# Patient Record
Sex: FEMALE | Race: WHITE | Employment: UNEMPLOYED | URBAN - METROPOLITAN AREA
[De-identification: names, ages, dates, MRNs, and addresses within clinical notes are randomized per-mention and may not be internally consistent; named-entity substitution may affect disease eponyms.]

---

## 2022-06-16 ENCOUNTER — OFFICE VISIT (OUTPATIENT)
Dept: INTERNAL MEDICINE | Age: 57
End: 2022-06-16
Payer: COMMERCIAL

## 2022-06-16 VITALS
DIASTOLIC BLOOD PRESSURE: 76 MMHG | HEART RATE: 102 BPM | BODY MASS INDEX: 38.32 KG/M2 | HEIGHT: 65 IN | TEMPERATURE: 99.5 F | WEIGHT: 230 LBS | SYSTOLIC BLOOD PRESSURE: 118 MMHG | OXYGEN SATURATION: 95 %

## 2022-06-16 DIAGNOSIS — B34.9 VIRAL ILLNESS: ICD-10-CM

## 2022-06-16 DIAGNOSIS — J01.40 ACUTE PANSINUSITIS, RECURRENCE NOT SPECIFIED: Primary | ICD-10-CM

## 2022-06-16 LAB
Lab: NORMAL
PERFORMING INSTRUMENT: NORMAL
QC PASS/FAIL: NORMAL
SARS-COV-2, POC: NORMAL

## 2022-06-16 PROCEDURE — 87426 SARSCOV CORONAVIRUS AG IA: CPT | Performed by: NURSE PRACTITIONER

## 2022-06-16 PROCEDURE — 99203 OFFICE O/P NEW LOW 30 MIN: CPT | Performed by: NURSE PRACTITIONER

## 2022-06-16 RX ORDER — AMOXICILLIN AND CLAVULANATE POTASSIUM 875; 125 MG/1; MG/1
1 TABLET, FILM COATED ORAL 2 TIMES DAILY
Qty: 20 TABLET | Refills: 0 | Status: SHIPPED | OUTPATIENT
Start: 2022-06-16 | End: 2022-06-26

## 2022-06-16 RX ORDER — CETIRIZINE HYDROCHLORIDE 10 MG/1
10 TABLET ORAL DAILY
Qty: 30 TABLET | Refills: 0 | Status: SHIPPED | OUTPATIENT
Start: 2022-06-16 | End: 2022-07-16

## 2022-06-16 RX ORDER — FLUTICASONE PROPIONATE 50 MCG
1 SPRAY, SUSPENSION (ML) NASAL DAILY
Qty: 1 EACH | Refills: 1 | Status: SHIPPED | OUTPATIENT
Start: 2022-06-16

## 2022-06-16 SDOH — ECONOMIC STABILITY: FOOD INSECURITY: WITHIN THE PAST 12 MONTHS, YOU WORRIED THAT YOUR FOOD WOULD RUN OUT BEFORE YOU GOT MONEY TO BUY MORE.: NEVER TRUE

## 2022-06-16 SDOH — ECONOMIC STABILITY: FOOD INSECURITY: WITHIN THE PAST 12 MONTHS, THE FOOD YOU BOUGHT JUST DIDN'T LAST AND YOU DIDN'T HAVE MONEY TO GET MORE.: NEVER TRUE

## 2022-06-16 ASSESSMENT — PATIENT HEALTH QUESTIONNAIRE - PHQ9
7. TROUBLE CONCENTRATING ON THINGS, SUCH AS READING THE NEWSPAPER OR WATCHING TELEVISION: 0
SUM OF ALL RESPONSES TO PHQ9 QUESTIONS 1 & 2: 0
SUM OF ALL RESPONSES TO PHQ QUESTIONS 1-9: 0
8. MOVING OR SPEAKING SO SLOWLY THAT OTHER PEOPLE COULD HAVE NOTICED. OR THE OPPOSITE, BEING SO FIGETY OR RESTLESS THAT YOU HAVE BEEN MOVING AROUND A LOT MORE THAN USUAL: 0
2. FEELING DOWN, DEPRESSED OR HOPELESS: 0
4. FEELING TIRED OR HAVING LITTLE ENERGY: 0
SUM OF ALL RESPONSES TO PHQ QUESTIONS 1-9: 0
1. LITTLE INTEREST OR PLEASURE IN DOING THINGS: 0
3. TROUBLE FALLING OR STAYING ASLEEP: 0
10. IF YOU CHECKED OFF ANY PROBLEMS, HOW DIFFICULT HAVE THESE PROBLEMS MADE IT FOR YOU TO DO YOUR WORK, TAKE CARE OF THINGS AT HOME, OR GET ALONG WITH OTHER PEOPLE: 0
5. POOR APPETITE OR OVEREATING: 0
6. FEELING BAD ABOUT YOURSELF - OR THAT YOU ARE A FAILURE OR HAVE LET YOURSELF OR YOUR FAMILY DOWN: 0
9. THOUGHTS THAT YOU WOULD BE BETTER OFF DEAD, OR OF HURTING YOURSELF: 0

## 2022-06-16 ASSESSMENT — ENCOUNTER SYMPTOMS
EYE ITCHING: 0
COUGH: 1
RHINORRHEA: 1
SORE THROAT: 0
SINUS PRESSURE: 1
SHORTNESS OF BREATH: 0
EYE REDNESS: 0
SINUS PAIN: 1
EYE DISCHARGE: 0
WHEEZING: 0

## 2022-06-16 ASSESSMENT — SOCIAL DETERMINANTS OF HEALTH (SDOH): HOW HARD IS IT FOR YOU TO PAY FOR THE VERY BASICS LIKE FOOD, HOUSING, MEDICAL CARE, AND HEATING?: NOT HARD AT ALL

## 2022-06-16 NOTE — PROGRESS NOTES
Stephen Napier (:  1965) is a 59 y.o. female, New patient, here for evaluation of the following chief complaint(s):  Cough (coughed up blood x's 3 days )      Vitals:    22 0923   BP: 118/76   Pulse: (!) 102   Temp: 99.5 °F (37.5 °C)   SpO2: 95%       ASSESSMENT/PLAN:  1. Acute pansinusitis, recurrence not specified  -     amoxicillin-clavulanate (AUGMENTIN) 875-125 MG per tablet; Take 1 tablet by mouth 2 times daily for 10 days, Disp-20 tablet, R-0Normal  -     cetirizine (ZYRTEC) 10 MG tablet; Take 1 tablet by mouth daily, Disp-30 tablet, R-0Normal  -     fluticasone (FLONASE) 50 MCG/ACT nasal spray; 1 spray by Nasal route daily, Disp-1 each, R-1Normal  2. Viral illness  -     POCT COVID-19, Antigen                              NEG        -     OTC symptom control        -      Tylenol or motrin as needed        -      Fluids encouraged      Return if symptoms worsen or fail to improve. SUBJECTIVE/OBJECTIVE:    Cough  This is a new problem. Episode onset: x3 days cough. The problem has been unchanged. The problem occurs hourly. The cough is productive of sputum (brown, yellow with blood). Associated symptoms include ear pain (bilateral itchy and pressure), nasal congestion, postnasal drip and rhinorrhea. Pertinent negatives include no chest pain, chills, eye redness, fever, headaches, myalgias, sore throat (tickly), shortness of breath, sweats or wheezing. Associated symptoms comments: Right side maxillary sinus pain, itchy ears. Treatments tried: mucinex. The treatment provided mild relief. Her past medical history is significant for environmental allergies. Review of Systems   Constitutional: Negative for chills, fatigue and fever. HENT: Positive for congestion, ear pain (bilateral itchy and pressure), postnasal drip, rhinorrhea, sinus pressure and sinus pain (right maxillary). Negative for sore throat (tickly). Eyes: Negative for discharge, redness and itching. Respiratory: Positive for cough. Negative for shortness of breath and wheezing. Cardiovascular: Negative for chest pain and palpitations. Musculoskeletal: Negative for arthralgias, myalgias and neck pain. Allergic/Immunologic: Positive for environmental allergies. Neurological: Negative for dizziness, light-headedness and headaches. Physical Exam  Vitals reviewed. Constitutional:       General: She is not in acute distress. Appearance: Normal appearance. HENT:      Right Ear: A middle ear effusion is present. Tympanic membrane is not erythematous. Left Ear: A middle ear effusion is present. Tympanic membrane is not erythematous. Nose: Mucosal edema present. Right Turbinates: Swollen. Left Turbinates: Swollen. Right Sinus: Maxillary sinus tenderness (pain) and frontal sinus tenderness present. Left Sinus: Maxillary sinus tenderness and frontal sinus tenderness present. Mouth/Throat:      Lips: Pink. Mouth: Mucous membranes are moist.      Pharynx: Oropharynx is clear. No oropharyngeal exudate or posterior oropharyngeal erythema. Cardiovascular:      Rate and Rhythm: Normal rate and regular rhythm. Heart sounds: Normal heart sounds, S1 normal and S2 normal.   Pulmonary:      Effort: Pulmonary effort is normal. No respiratory distress. Breath sounds: Normal air entry. No decreased breath sounds, wheezing, rhonchi or rales. Skin:     General: Skin is warm and dry. Neurological:      Mental Status: She is alert and oriented to person, place, and time. Psychiatric:         Mood and Affect: Mood normal.         Behavior: Behavior is cooperative. An electronic signature was used to authenticate this note.     --BRAD Chiang

## 2022-06-16 NOTE — PATIENT INSTRUCTIONS
Patient Education        Sinusitis: Care Instructions  Your Care Instructions     Sinusitis is an infection of the lining of the sinus cavities in your head. Sinusitis often follows a cold. It causes pain and pressure in your head andface. In most cases, sinusitis gets better on its own in 1 to 2 weeks. But some mildsymptoms may last for several weeks. Sometimes antibiotics are needed. Follow-up care is a key part of your treatment and safety. Be sure to make and go to all appointments, and call your doctor if you are having problems. It's also a good idea to know your test results and keep alist of the medicines you take. How can you care for yourself at home?  Take an over-the-counter pain medicine, such as acetaminophen (Tylenol), ibuprofen (Advil, Motrin), or naproxen (Aleve). Read and follow all instructions on the label.  If the doctor prescribed antibiotics, take them as directed. Do not stop taking them just because you feel better. You need to take the full course of antibiotics.  Be careful when taking over-the-counter cold or flu medicines and Tylenol at the same time. Many of these medicines have acetaminophen, which is Tylenol. Read the labels to make sure that you are not taking more than the recommended dose. Too much acetaminophen (Tylenol) can be harmful.  Breathe warm, moist air from a steamy shower, a hot bath, or a sink filled with hot water. Avoid cold, dry air. Using a humidifier in your home may help. Follow the directions for cleaning the machine.  Use saline (saltwater) nasal washes. This can help keep your nasal passages open and wash out mucus and bacteria. You can buy saline nose drops at a grocery store or drugstore. Or you can make your own at home by adding 1 teaspoon of salt and 1 teaspoon of baking soda to 2 cups of distilled water. If you make your own, fill a bulb syringe with the solution, insert the tip into your nostril, and squeeze gently. Francisco Lisbet your nose.    Put a hot, wet towel or a warm gel pack on your face 3 or 4 times a day for 5 to 10 minutes each time.  Try a decongestant nasal spray like oxymetazoline (Afrin). Do not use it for more than 3 days in a row. Using it for more than 3 days can make your congestion worse. When should you call for help? Call your doctor now or seek immediate medical care if:     You have new or worse swelling or redness in your face or around your eyes.      You have a new or higher fever. Watch closely for changes in your health, and be sure to contact your doctor if:     You have new or worse facial pain.      The mucus from your nose becomes thicker (like pus) or has new blood in it.      You are not getting better as expected. Where can you learn more? Go to https://Wakie/Budistpepiceweb.Wistia. org and sign in to your [employer] account. Enter I683 in the [employer] box to learn more about \"Sinusitis: Care Instructions. \"     If you do not have an account, please click on the \"Sign Up Now\" link. Current as of: September 8, 2021               Content Version: 13.2  © 2006-2022 [employer]. Care instructions adapted under license by [facility] ([facility]). If you have questions about a medical condition or this instruction, always ask your healthcare professional. [address] any warranty or liability for your use of this information. Patient Education        Viral Infections: Care Instructions  Overview     You don't feel well, but it's not clear what's causing it. You may have a viral infection. Viruses cause many illnesses, such as the common cold, influenza, fever, rashes, and the diarrhea, nausea, and vomiting that are symptoms of a stomach infection. You may wonder if antibiotic medicines could make you feel better. But antibiotics only treat infections caused by bacteria. They don'twork on viruses. The good news is that viral infections usually aren't serious.  Most will go away in a few days without medical treatment. In the meantime, there are a fewthings you can do to make yourself more comfortable. Follow-up care is a key part of your treatment and safety. Be sure to make and go to all appointments, and call your doctor if you are having problems. It's also a good idea to know your test results and keep alist of the medicines you take. How can you care for yourself at home?  Get plenty of rest if you feel tired.  Take an over-the-counter pain medicine if needed, such as acetaminophen (Tylenol), ibuprofen (Advil, Motrin), or naproxen (Aleve). Read and follow all instructions on the label.  Be careful when taking over-the-counter cold or flu medicines and Tylenol at the same time. Many of these medicines have acetaminophen, which is Tylenol. Read the labels to make sure that you are not taking more than the recommended dose. Too much acetaminophen (Tylenol) can be harmful.  Drink plenty of fluids. If you have kidney, heart, or liver disease and have to limit fluids, talk with your doctor before you increase the amount of fluids you drink.  Stay home from work, school, and other public places while you have a fever. When should you call for help? Call 911 anytime you think you may need emergency care. For example, call if:     You have severe trouble breathing.      You passed out (lost consciousness). Call your doctor now or seek immediate medical care if:     You seem to be getting much sicker.      You have a new or higher fever.      You have blood in your stools.      You have new belly pain, or your pain gets worse.      You have a new rash. Watch closely for changes in your health, and be sure to contact your doctor if:     You start to get better and then get worse.      You do not get better as expected. Where can you learn more? Go to https://eden.[employer]. org and sign in to your [facility] account.  Enter W202 in the [address] Information box to learn more about \"Viral Infections: Care Instructions. \"     If you do not have an account, please click on the \"Sign Up Now\" link. Current as of: July 1, 2021               Content Version: 13.2  © 0285-0524 [employer]. Care instructions adapted under license by [facility] ([facility]). If you have questions about a medical condition or this instruction, always ask your healthcare professional. [address] any warranty or liability for your use of this information.

## 2025-08-03 ENCOUNTER — APPOINTMENT (OUTPATIENT)
Dept: CARDIOLOGY | Facility: HOSPITAL | Age: 60
End: 2025-08-03
Payer: COMMERCIAL

## 2025-08-03 ENCOUNTER — HOSPITAL ENCOUNTER (OUTPATIENT)
Facility: HOSPITAL | Age: 60
Setting detail: OBSERVATION
End: 2025-08-03
Attending: EMERGENCY MEDICINE | Admitting: HOSPITALIST
Payer: COMMERCIAL

## 2025-08-03 ENCOUNTER — APPOINTMENT (OUTPATIENT)
Dept: RADIOLOGY | Facility: HOSPITAL | Age: 60
End: 2025-08-03
Payer: COMMERCIAL

## 2025-08-03 VITALS
BODY MASS INDEX: 41.4 KG/M2 | SYSTOLIC BLOOD PRESSURE: 153 MMHG | RESPIRATION RATE: 18 BRPM | HEIGHT: 64 IN | OXYGEN SATURATION: 95 % | TEMPERATURE: 99.3 F | HEART RATE: 110 BPM | WEIGHT: 242.51 LBS | DIASTOLIC BLOOD PRESSURE: 89 MMHG

## 2025-08-03 DIAGNOSIS — R07.9 CHEST PAIN, UNSPECIFIED TYPE: Primary | ICD-10-CM

## 2025-08-03 DIAGNOSIS — I25.9 CHEST PAIN DUE TO MYOCARDIAL ISCHEMIA, UNSPECIFIED ISCHEMIC CHEST PAIN TYPE: ICD-10-CM

## 2025-08-03 DIAGNOSIS — I10 HYPERTENSION, UNSPECIFIED TYPE: ICD-10-CM

## 2025-08-03 LAB
ALBUMIN SERPL BCP-MCNC: 4.5 G/DL (ref 3.4–5)
ALP SERPL-CCNC: 84 U/L (ref 33–136)
ALT SERPL W P-5'-P-CCNC: 11 U/L (ref 7–45)
ANION GAP SERPL CALC-SCNC: 14 MMOL/L (ref 10–20)
AST SERPL W P-5'-P-CCNC: 13 U/L (ref 9–39)
ATRIAL RATE: 84 BPM
BASOPHILS # BLD AUTO: 0.06 X10*3/UL (ref 0–0.1)
BASOPHILS NFR BLD AUTO: 0.7 %
BILIRUB SERPL-MCNC: 0.6 MG/DL (ref 0–1.2)
BNP SERPL-MCNC: 47 PG/ML (ref 0–99)
BUN SERPL-MCNC: 9 MG/DL (ref 6–23)
CALCIUM SERPL-MCNC: 9.2 MG/DL (ref 8.6–10.3)
CARDIAC TROPONIN I PNL SERPL HS: 3 NG/L (ref 0–13)
CARDIAC TROPONIN I PNL SERPL HS: 4 NG/L (ref 0–13)
CARDIAC TROPONIN I PNL SERPL HS: 4 NG/L (ref 0–13)
CARDIAC TROPONIN I PNL SERPL HS: 5 NG/L (ref 0–13)
CHLORIDE SERPL-SCNC: 107 MMOL/L (ref 98–107)
CHOLEST SERPL-MCNC: 160 MG/DL (ref 0–199)
CHOLESTEROL/HDL RATIO: 3.2
CO2 SERPL-SCNC: 24 MMOL/L (ref 21–32)
CREAT SERPL-MCNC: 0.68 MG/DL (ref 0.5–1.05)
EGFRCR SERPLBLD CKD-EPI 2021: >90 ML/MIN/1.73M*2
EOSINOPHIL # BLD AUTO: 0.19 X10*3/UL (ref 0–0.7)
EOSINOPHIL NFR BLD AUTO: 2.1 %
ERYTHROCYTE [DISTWIDTH] IN BLOOD BY AUTOMATED COUNT: 12.8 % (ref 11.5–14.5)
EST. AVERAGE GLUCOSE BLD GHB EST-MCNC: 108 MG/DL
GLUCOSE SERPL-MCNC: 113 MG/DL (ref 74–99)
HBA1C MFR BLD: 5.4 % (ref ?–5.7)
HCT VFR BLD AUTO: 41.6 % (ref 36–46)
HDLC SERPL-MCNC: 50.1 MG/DL
HGB BLD-MCNC: 14.3 G/DL (ref 12–16)
IMM GRANULOCYTES # BLD AUTO: 0.02 X10*3/UL (ref 0–0.7)
IMM GRANULOCYTES NFR BLD AUTO: 0.2 % (ref 0–0.9)
INR PPP: 1 (ref 0.9–1.1)
LDLC SERPL CALC-MCNC: 96 MG/DL
LYMPHOCYTES # BLD AUTO: 2.12 X10*3/UL (ref 1.2–4.8)
LYMPHOCYTES NFR BLD AUTO: 23.3 %
MAGNESIUM SERPL-MCNC: 2.07 MG/DL (ref 1.6–2.4)
MCH RBC QN AUTO: 29.6 PG (ref 26–34)
MCHC RBC AUTO-ENTMCNC: 34.4 G/DL (ref 32–36)
MCV RBC AUTO: 86 FL (ref 80–100)
MONOCYTES # BLD AUTO: 0.48 X10*3/UL (ref 0.1–1)
MONOCYTES NFR BLD AUTO: 5.3 %
NEUTROPHILS # BLD AUTO: 6.23 X10*3/UL (ref 1.2–7.7)
NEUTROPHILS NFR BLD AUTO: 68.4 %
NON HDL CHOLESTEROL: 110 MG/DL (ref 0–149)
NRBC BLD-RTO: 0 /100 WBCS (ref 0–0)
P AXIS: 44 DEGREES
P OFFSET: 183 MS
P ONSET: 126 MS
PLATELET # BLD AUTO: 355 X10*3/UL (ref 150–450)
POTASSIUM SERPL-SCNC: 3.6 MMOL/L (ref 3.5–5.3)
PR INTERVAL: 180 MS
PROT SERPL-MCNC: 7.1 G/DL (ref 6.4–8.2)
PROTHROMBIN TIME: 11.3 SECONDS (ref 9.8–12.4)
Q ONSET: 216 MS
QRS COUNT: 14 BEATS
QRS DURATION: 80 MS
QT INTERVAL: 396 MS
QTC CALCULATION(BAZETT): 467 MS
QTC FREDERICIA: 442 MS
R AXIS: 162 DEGREES
RBC # BLD AUTO: 4.83 X10*6/UL (ref 4–5.2)
SODIUM SERPL-SCNC: 141 MMOL/L (ref 136–145)
T AXIS: 68 DEGREES
T OFFSET: 414 MS
TRIGL SERPL-MCNC: 72 MG/DL (ref 0–149)
VENTRICULAR RATE: 84 BPM
VLDL: 14 MG/DL (ref 0–40)
WBC # BLD AUTO: 9.1 X10*3/UL (ref 4.4–11.3)

## 2025-08-03 PROCEDURE — 84484 ASSAY OF TROPONIN QUANT: CPT | Performed by: HOSPITALIST

## 2025-08-03 PROCEDURE — 36415 COLL VENOUS BLD VENIPUNCTURE: CPT | Performed by: HOSPITALIST

## 2025-08-03 PROCEDURE — 2500000004 HC RX 250 GENERAL PHARMACY W/ HCPCS (ALT 636 FOR OP/ED): Performed by: HOSPITALIST

## 2025-08-03 PROCEDURE — 2500000001 HC RX 250 WO HCPCS SELF ADMINISTERED DRUGS (ALT 637 FOR MEDICARE OP): Performed by: EMERGENCY MEDICINE

## 2025-08-03 PROCEDURE — 84484 ASSAY OF TROPONIN QUANT: CPT | Performed by: EMERGENCY MEDICINE

## 2025-08-03 PROCEDURE — 93005 ELECTROCARDIOGRAM TRACING: CPT

## 2025-08-03 PROCEDURE — 96372 THER/PROPH/DIAG INJ SC/IM: CPT | Performed by: HOSPITALIST

## 2025-08-03 PROCEDURE — 83880 ASSAY OF NATRIURETIC PEPTIDE: CPT | Performed by: EMERGENCY MEDICINE

## 2025-08-03 PROCEDURE — 2500000001 HC RX 250 WO HCPCS SELF ADMINISTERED DRUGS (ALT 637 FOR MEDICARE OP): Performed by: NURSE PRACTITIONER

## 2025-08-03 PROCEDURE — 83036 HEMOGLOBIN GLYCOSYLATED A1C: CPT | Mod: ELYLAB | Performed by: HOSPITALIST

## 2025-08-03 PROCEDURE — 80053 COMPREHEN METABOLIC PANEL: CPT | Performed by: EMERGENCY MEDICINE

## 2025-08-03 PROCEDURE — 85025 COMPLETE CBC W/AUTO DIFF WBC: CPT | Performed by: EMERGENCY MEDICINE

## 2025-08-03 PROCEDURE — 85610 PROTHROMBIN TIME: CPT | Performed by: EMERGENCY MEDICINE

## 2025-08-03 PROCEDURE — 99223 1ST HOSP IP/OBS HIGH 75: CPT | Performed by: HOSPITALIST

## 2025-08-03 PROCEDURE — 96374 THER/PROPH/DIAG INJ IV PUSH: CPT

## 2025-08-03 PROCEDURE — 2500000004 HC RX 250 GENERAL PHARMACY W/ HCPCS (ALT 636 FOR OP/ED): Performed by: EMERGENCY MEDICINE

## 2025-08-03 PROCEDURE — 2500000001 HC RX 250 WO HCPCS SELF ADMINISTERED DRUGS (ALT 637 FOR MEDICARE OP): Performed by: HOSPITALIST

## 2025-08-03 PROCEDURE — 2500000005 HC RX 250 GENERAL PHARMACY W/O HCPCS: Performed by: EMERGENCY MEDICINE

## 2025-08-03 PROCEDURE — 71045 X-RAY EXAM CHEST 1 VIEW: CPT

## 2025-08-03 PROCEDURE — 71045 X-RAY EXAM CHEST 1 VIEW: CPT | Mod: FOREIGN READ | Performed by: RADIOLOGY

## 2025-08-03 PROCEDURE — 83735 ASSAY OF MAGNESIUM: CPT | Performed by: EMERGENCY MEDICINE

## 2025-08-03 PROCEDURE — 96376 TX/PRO/DX INJ SAME DRUG ADON: CPT

## 2025-08-03 PROCEDURE — G0378 HOSPITAL OBSERVATION PER HR: HCPCS

## 2025-08-03 PROCEDURE — 96375 TX/PRO/DX INJ NEW DRUG ADDON: CPT

## 2025-08-03 PROCEDURE — 99285 EMERGENCY DEPT VISIT HI MDM: CPT | Performed by: EMERGENCY MEDICINE

## 2025-08-03 PROCEDURE — 80061 LIPID PANEL: CPT | Performed by: HOSPITALIST

## 2025-08-03 PROCEDURE — 36415 COLL VENOUS BLD VENIPUNCTURE: CPT | Performed by: EMERGENCY MEDICINE

## 2025-08-03 RX ORDER — HYDRALAZINE HYDROCHLORIDE 20 MG/ML
20 INJECTION INTRAMUSCULAR; INTRAVENOUS ONCE
Status: COMPLETED | OUTPATIENT
Start: 2025-08-03 | End: 2025-08-03

## 2025-08-03 RX ORDER — ACETAMINOPHEN 650 MG/1
650 SUPPOSITORY RECTAL EVERY 4 HOURS PRN
Status: DISCONTINUED | OUTPATIENT
Start: 2025-08-03 | End: 2025-08-04 | Stop reason: HOSPADM

## 2025-08-03 RX ORDER — CALCIUM CARBONATE 200(500)MG
500 TABLET,CHEWABLE ORAL 4 TIMES DAILY PRN
Status: DISCONTINUED | OUTPATIENT
Start: 2025-08-03 | End: 2025-08-04 | Stop reason: HOSPADM

## 2025-08-03 RX ORDER — ENOXAPARIN SODIUM 100 MG/ML
40 INJECTION SUBCUTANEOUS EVERY 24 HOURS
Status: DISCONTINUED | OUTPATIENT
Start: 2025-08-03 | End: 2025-08-04 | Stop reason: HOSPADM

## 2025-08-03 RX ORDER — ONDANSETRON 4 MG/1
4 TABLET, FILM COATED ORAL EVERY 8 HOURS PRN
Status: DISCONTINUED | OUTPATIENT
Start: 2025-08-03 | End: 2025-08-04 | Stop reason: HOSPADM

## 2025-08-03 RX ORDER — ACETAMINOPHEN 500 MG
5 TABLET ORAL NIGHTLY PRN
Status: DISCONTINUED | OUTPATIENT
Start: 2025-08-03 | End: 2025-08-04 | Stop reason: HOSPADM

## 2025-08-03 RX ORDER — ACETAMINOPHEN 325 MG/1
650 TABLET ORAL EVERY 4 HOURS PRN
Status: DISCONTINUED | OUTPATIENT
Start: 2025-08-03 | End: 2025-08-04 | Stop reason: HOSPADM

## 2025-08-03 RX ORDER — PANTOPRAZOLE SODIUM 40 MG/10ML
40 INJECTION, POWDER, LYOPHILIZED, FOR SOLUTION INTRAVENOUS
Status: DISCONTINUED | OUTPATIENT
Start: 2025-08-04 | End: 2025-08-04 | Stop reason: HOSPADM

## 2025-08-03 RX ORDER — ASPIRIN 325 MG
325 TABLET ORAL ONCE
Status: COMPLETED | OUTPATIENT
Start: 2025-08-03 | End: 2025-08-03

## 2025-08-03 RX ORDER — ONDANSETRON HYDROCHLORIDE 2 MG/ML
4 INJECTION, SOLUTION INTRAVENOUS EVERY 8 HOURS PRN
Status: DISCONTINUED | OUTPATIENT
Start: 2025-08-03 | End: 2025-08-04 | Stop reason: HOSPADM

## 2025-08-03 RX ORDER — CETIRIZINE HYDROCHLORIDE 10 MG/1
10 TABLET ORAL DAILY
Status: DISCONTINUED | OUTPATIENT
Start: 2025-08-03 | End: 2025-08-04 | Stop reason: HOSPADM

## 2025-08-03 RX ORDER — NAPROXEN SODIUM 220 MG/1
81 TABLET, FILM COATED ORAL DAILY
Status: DISCONTINUED | OUTPATIENT
Start: 2025-08-03 | End: 2025-08-04 | Stop reason: HOSPADM

## 2025-08-03 RX ORDER — ACETAMINOPHEN 160 MG/5ML
650 SOLUTION ORAL EVERY 4 HOURS PRN
Status: DISCONTINUED | OUTPATIENT
Start: 2025-08-03 | End: 2025-08-04 | Stop reason: HOSPADM

## 2025-08-03 RX ORDER — PANTOPRAZOLE SODIUM 40 MG/1
40 TABLET, DELAYED RELEASE ORAL
Status: DISCONTINUED | OUTPATIENT
Start: 2025-08-04 | End: 2025-08-04 | Stop reason: HOSPADM

## 2025-08-03 RX ORDER — NITROGLYCERIN 20 MG/G
1 OINTMENT TOPICAL ONCE
Status: COMPLETED | OUTPATIENT
Start: 2025-08-03 | End: 2025-08-03

## 2025-08-03 RX ORDER — OMEPRAZOLE 20 MG/1
20 TABLET, DELAYED RELEASE ORAL DAILY
COMMUNITY

## 2025-08-03 RX ORDER — POLYETHYLENE GLYCOL 3350 17 G/17G
17 POWDER, FOR SOLUTION ORAL DAILY
Status: DISCONTINUED | OUTPATIENT
Start: 2025-08-03 | End: 2025-08-04 | Stop reason: HOSPADM

## 2025-08-03 RX ORDER — ONDANSETRON HYDROCHLORIDE 2 MG/ML
4 INJECTION, SOLUTION INTRAVENOUS ONCE
Status: COMPLETED | OUTPATIENT
Start: 2025-08-03 | End: 2025-08-03

## 2025-08-03 RX ORDER — ATORVASTATIN CALCIUM 20 MG/1
40 TABLET, FILM COATED ORAL NIGHTLY
Status: DISCONTINUED | OUTPATIENT
Start: 2025-08-03 | End: 2025-08-04

## 2025-08-03 RX ADMIN — CETIRIZINE HYDROCHLORIDE 10 MG: 10 TABLET ORAL at 20:37

## 2025-08-03 RX ADMIN — NITROGLYCERIN 1 INCH: 20 OINTMENT TOPICAL at 15:11

## 2025-08-03 RX ADMIN — ATORVASTATIN CALCIUM 40 MG: 20 TABLET, FILM COATED ORAL at 20:30

## 2025-08-03 RX ADMIN — HYDRALAZINE HYDROCHLORIDE 20 MG: 20 INJECTION INTRAMUSCULAR; INTRAVENOUS at 16:07

## 2025-08-03 RX ADMIN — SALINE NASAL SPRAY 1 SPRAY: 1.5 SOLUTION NASAL at 21:07

## 2025-08-03 RX ADMIN — ONDANSETRON 4 MG: 2 INJECTION INTRAMUSCULAR; INTRAVENOUS at 20:56

## 2025-08-03 RX ADMIN — ONDANSETRON 4 MG: 2 INJECTION INTRAMUSCULAR; INTRAVENOUS at 15:34

## 2025-08-03 RX ADMIN — ANTACID TABLETS 1 TABLET: 500 TABLET, CHEWABLE ORAL at 20:36

## 2025-08-03 RX ADMIN — ASPIRIN 325 MG: 325 TABLET ORAL at 15:11

## 2025-08-03 RX ADMIN — ACETAMINOPHEN 650 MG: 325 TABLET ORAL at 20:37

## 2025-08-03 RX ADMIN — Medication 5 MG: at 20:37

## 2025-08-03 SDOH — SOCIAL STABILITY: SOCIAL INSECURITY: WITHIN THE LAST YEAR, HAVE YOU BEEN AFRAID OF YOUR PARTNER OR EX-PARTNER?: NO

## 2025-08-03 SDOH — ECONOMIC STABILITY: INCOME INSECURITY: IN THE PAST 12 MONTHS HAS THE ELECTRIC, GAS, OIL, OR WATER COMPANY THREATENED TO SHUT OFF SERVICES IN YOUR HOME?: NO

## 2025-08-03 SDOH — ECONOMIC STABILITY: FOOD INSECURITY: WITHIN THE PAST 12 MONTHS, YOU WORRIED THAT YOUR FOOD WOULD RUN OUT BEFORE YOU GOT THE MONEY TO BUY MORE.: NEVER TRUE

## 2025-08-03 SDOH — SOCIAL STABILITY: SOCIAL INSECURITY: DO YOU FEEL ANYONE HAS EXPLOITED OR TAKEN ADVANTAGE OF YOU FINANCIALLY OR OF YOUR PERSONAL PROPERTY?: NO

## 2025-08-03 SDOH — ECONOMIC STABILITY: HOUSING INSECURITY: IN THE LAST 12 MONTHS, WAS THERE A TIME WHEN YOU WERE NOT ABLE TO PAY THE MORTGAGE OR RENT ON TIME?: NO

## 2025-08-03 SDOH — HEALTH STABILITY: PHYSICAL HEALTH
HOW OFTEN DO YOU NEED TO HAVE SOMEONE HELP YOU WHEN YOU READ INSTRUCTIONS, PAMPHLETS, OR OTHER WRITTEN MATERIAL FROM YOUR DOCTOR OR PHARMACY?: SOMETIMES

## 2025-08-03 SDOH — SOCIAL STABILITY: SOCIAL INSECURITY: ABUSE: ADULT

## 2025-08-03 SDOH — SOCIAL STABILITY: SOCIAL INSECURITY: ARE THERE ANY APPARENT SIGNS OF INJURIES/BEHAVIORS THAT COULD BE RELATED TO ABUSE/NEGLECT?: NO

## 2025-08-03 SDOH — SOCIAL STABILITY: SOCIAL NETWORK: HOW OFTEN DO YOU GET TOGETHER WITH FRIENDS OR RELATIVES?: ONCE A WEEK

## 2025-08-03 SDOH — SOCIAL STABILITY: SOCIAL INSECURITY: HAVE YOU HAD THOUGHTS OF HARMING ANYONE ELSE?: YES

## 2025-08-03 SDOH — HEALTH STABILITY: PHYSICAL HEALTH: ON AVERAGE, HOW MANY MINUTES DO YOU ENGAGE IN EXERCISE AT THIS LEVEL?: 10 MIN

## 2025-08-03 SDOH — SOCIAL STABILITY: SOCIAL INSECURITY
WITHIN THE LAST YEAR, HAVE YOU BEEN KICKED, HIT, SLAPPED, OR OTHERWISE PHYSICALLY HURT BY YOUR PARTNER OR EX-PARTNER?: NO

## 2025-08-03 SDOH — ECONOMIC STABILITY: FOOD INSECURITY: WITHIN THE PAST 12 MONTHS, THE FOOD YOU BOUGHT JUST DIDN'T LAST AND YOU DIDN'T HAVE MONEY TO GET MORE.: NEVER TRUE

## 2025-08-03 SDOH — ECONOMIC STABILITY: FOOD INSECURITY: HOW HARD IS IT FOR YOU TO PAY FOR THE VERY BASICS LIKE FOOD, HOUSING, MEDICAL CARE, AND HEATING?: NOT HARD AT ALL

## 2025-08-03 SDOH — ECONOMIC STABILITY: HOUSING INSECURITY: AT ANY TIME IN THE PAST 12 MONTHS, WERE YOU HOMELESS OR LIVING IN A SHELTER (INCLUDING NOW)?: NO

## 2025-08-03 SDOH — HEALTH STABILITY: MENTAL HEALTH
DO YOU FEEL STRESS - TENSE, RESTLESS, NERVOUS, OR ANXIOUS, OR UNABLE TO SLEEP AT NIGHT BECAUSE YOUR MIND IS TROUBLED ALL THE TIME - THESE DAYS?: ONLY A LITTLE

## 2025-08-03 SDOH — SOCIAL STABILITY: SOCIAL INSECURITY
WITHIN THE LAST YEAR, HAVE YOU BEEN RAPED OR FORCED TO HAVE ANY KIND OF SEXUAL ACTIVITY BY YOUR PARTNER OR EX-PARTNER?: NO

## 2025-08-03 SDOH — SOCIAL STABILITY: SOCIAL INSECURITY: WITHIN THE LAST YEAR, HAVE YOU BEEN HUMILIATED OR EMOTIONALLY ABUSED IN OTHER WAYS BY YOUR PARTNER OR EX-PARTNER?: NO

## 2025-08-03 SDOH — SOCIAL STABILITY: SOCIAL INSECURITY: DO YOU FEEL UNSAFE GOING BACK TO THE PLACE WHERE YOU ARE LIVING?: NO

## 2025-08-03 SDOH — SOCIAL STABILITY: SOCIAL INSECURITY: ARE YOU MARRIED, WIDOWED, DIVORCED, SEPARATED, NEVER MARRIED, OR LIVING WITH A PARTNER?: MARRIED

## 2025-08-03 SDOH — SOCIAL STABILITY: SOCIAL NETWORK: HOW OFTEN DO YOU ATTEND CHURCH OR RELIGIOUS SERVICES?: NEVER

## 2025-08-03 SDOH — SOCIAL STABILITY: SOCIAL INSECURITY: WERE YOU ABLE TO COMPLETE ALL THE BEHAVIORAL HEALTH SCREENINGS?: YES

## 2025-08-03 SDOH — ECONOMIC STABILITY: TRANSPORTATION INSECURITY: IN THE PAST 12 MONTHS, HAS LACK OF TRANSPORTATION KEPT YOU FROM MEDICAL APPOINTMENTS OR FROM GETTING MEDICATIONS?: NO

## 2025-08-03 SDOH — ECONOMIC STABILITY: HOUSING INSECURITY: IN THE PAST 12 MONTHS, HOW MANY TIMES HAVE YOU MOVED WHERE YOU WERE LIVING?: 0

## 2025-08-03 SDOH — SOCIAL STABILITY: SOCIAL NETWORK: IN A TYPICAL WEEK, HOW MANY TIMES DO YOU TALK ON THE PHONE WITH FAMILY, FRIENDS, OR NEIGHBORS?: ONCE A WEEK

## 2025-08-03 SDOH — SOCIAL STABILITY: SOCIAL INSECURITY: HAS ANYONE EVER THREATENED TO HURT YOUR FAMILY OR YOUR PETS?: NO

## 2025-08-03 SDOH — SOCIAL STABILITY: SOCIAL INSECURITY: ARE YOU OR HAVE YOU BEEN THREATENED OR ABUSED PHYSICALLY, EMOTIONALLY, OR SEXUALLY BY ANYONE?: NO

## 2025-08-03 SDOH — HEALTH STABILITY: PHYSICAL HEALTH: ON AVERAGE, HOW MANY DAYS PER WEEK DO YOU ENGAGE IN MODERATE TO STRENUOUS EXERCISE (LIKE A BRISK WALK)?: 2 DAYS

## 2025-08-03 SDOH — SOCIAL STABILITY: SOCIAL INSECURITY: DOES ANYONE TRY TO KEEP YOU FROM HAVING/CONTACTING OTHER FRIENDS OR DOING THINGS OUTSIDE YOUR HOME?: NO

## 2025-08-03 SDOH — SOCIAL STABILITY: SOCIAL INSECURITY: HAVE YOU HAD ANY THOUGHTS OF HARMING ANYONE ELSE?: NO

## 2025-08-03 ASSESSMENT — COGNITIVE AND FUNCTIONAL STATUS - GENERAL
MOBILITY SCORE: 24
DAILY ACTIVITIY SCORE: 24
PATIENT BASELINE BEDBOUND: NO

## 2025-08-03 ASSESSMENT — ACTIVITIES OF DAILY LIVING (ADL)
JUDGMENT_ADEQUATE_SAFELY_COMPLETE_DAILY_ACTIVITIES: YES
FEEDING YOURSELF: INDEPENDENT
GROOMING: INDEPENDENT
LACK_OF_TRANSPORTATION: NO
TOILETING: INDEPENDENT
ADEQUATE_TO_COMPLETE_ADL: YES
PATIENT'S MEMORY ADEQUATE TO SAFELY COMPLETE DAILY ACTIVITIES?: YES
HEARING - RIGHT EAR: FUNCTIONAL
LACK_OF_TRANSPORTATION: NO
WALKS IN HOME: INDEPENDENT
BATHING: INDEPENDENT
DRESSING YOURSELF: INDEPENDENT
ASSISTIVE_DEVICE: EYEGLASSES
HEARING - LEFT EAR: FUNCTIONAL

## 2025-08-03 ASSESSMENT — PAIN DESCRIPTION - PROGRESSION: CLINICAL_PROGRESSION: NOT CHANGED

## 2025-08-03 ASSESSMENT — PAIN DESCRIPTION - LOCATION
LOCATION: CHEST
LOCATION: CHEST

## 2025-08-03 ASSESSMENT — PAIN SCALES - GENERAL
PAINLEVEL_OUTOF10: 8
PAINLEVEL_OUTOF10: 0 - NO PAIN
PAINLEVEL_OUTOF10: 0 - NO PAIN
PAINLEVEL_OUTOF10: 8
PAINLEVEL_OUTOF10: 2

## 2025-08-03 ASSESSMENT — LIFESTYLE VARIABLES
TOTAL SCORE: 0
AUDIT-C TOTAL SCORE: 0
HAVE PEOPLE ANNOYED YOU BY CRITICIZING YOUR DRINKING: NO
HOW OFTEN DO YOU HAVE A DRINK CONTAINING ALCOHOL: NEVER
HOW OFTEN DO YOU HAVE 6 OR MORE DRINKS ON ONE OCCASION: NEVER
EVER HAD A DRINK FIRST THING IN THE MORNING TO STEADY YOUR NERVES TO GET RID OF A HANGOVER: NO
HOW MANY STANDARD DRINKS CONTAINING ALCOHOL DO YOU HAVE ON A TYPICAL DAY: PATIENT DOES NOT DRINK
SKIP TO QUESTIONS 9-10: 1
AUDIT-C TOTAL SCORE: 0
EVER FELT BAD OR GUILTY ABOUT YOUR DRINKING: NO
HAVE YOU EVER FELT YOU SHOULD CUT DOWN ON YOUR DRINKING: NO

## 2025-08-03 ASSESSMENT — HEART SCORE
TROPONIN: LESS THAN OR EQUAL TO NORMAL LIMIT
AGE: 45-64
ECG: NON-SPECIFIC REPOLARIZATION DISTURBANCE
HEART SCORE: 5
HISTORY: HIGHLY SUSPICIOUS
RISK FACTORS: 1-2 RISK FACTORS

## 2025-08-03 ASSESSMENT — PAIN DESCRIPTION - ORIENTATION: ORIENTATION: UPPER

## 2025-08-03 ASSESSMENT — PAIN - FUNCTIONAL ASSESSMENT
PAIN_FUNCTIONAL_ASSESSMENT: 0-10

## 2025-08-03 ASSESSMENT — PATIENT HEALTH QUESTIONNAIRE - PHQ9
1. LITTLE INTEREST OR PLEASURE IN DOING THINGS: NOT AT ALL
SUM OF ALL RESPONSES TO PHQ9 QUESTIONS 1 & 2: 0
2. FEELING DOWN, DEPRESSED OR HOPELESS: NOT AT ALL

## 2025-08-03 ASSESSMENT — PAIN DESCRIPTION - DESCRIPTORS: DESCRIPTORS: SHARP

## 2025-08-03 ASSESSMENT — PAIN DESCRIPTION - FREQUENCY: FREQUENCY: CONSTANT/CONTINUOUS

## 2025-08-03 ASSESSMENT — PAIN DESCRIPTION - ONSET: ONSET: ONGOING

## 2025-08-03 ASSESSMENT — PAIN DESCRIPTION - PAIN TYPE: TYPE: ACUTE PAIN

## 2025-08-03 NOTE — H&P
History Of Present Illness  Radha Han is a 60 y.o. female with past medical history of morbid obesity who presented with chest pain.  She has been complaining of chest pain since last night.  The pain is located in the midsternal area.  The pain radiated to her arm.  It is pressure pain in nature.  It is 10/10 intensity.  The pain got better by medication given in the emergency room.  No other aggravating factors.  Associated with nausea.  No diaphoresis.  No shortness of breath.  No orthopnea or PND.  No cough.  No fever or chills.  No abdominal pain.  No diarrhea.  No constipation.  Denies any diarrhea dysuria, hematuria, or frequency.       Surgical History  She has no past surgical history on file.     Social History  She has no history on file for tobacco use, alcohol use, and drug use.    Family History  Family History[1]  No family history of CAD     Allergies  Codeine    Review of Systems   All other systems reviewed and are negative.       Physical Exam  Vitals and nursing note reviewed.   HENT:      Head: Normocephalic and atraumatic.      Nose: Nose normal.      Mouth/Throat:      Mouth: Mucous membranes are dry.     Eyes:      Extraocular Movements: Extraocular movements intact.      Pupils: Pupils are equal, round, and reactive to light.       Cardiovascular:      Rate and Rhythm: Normal rate and regular rhythm.      Pulses: Normal pulses.   Pulmonary:      Effort: Pulmonary effort is normal.      Breath sounds: Normal breath sounds.   Abdominal:      General: Abdomen is flat.      Palpations: Abdomen is soft.     Musculoskeletal:         General: Normal range of motion.      Cervical back: Normal range of motion and neck supple.     Skin:     General: Skin is warm.     Neurological:      General: No focal deficit present.      Mental Status: She is alert and oriented to person, place, and time.     Psychiatric:         Mood and Affect: Mood normal.         Behavior: Behavior normal.           Last Recorded Vitals  BP (!) 188/96   Pulse 74   Temp 36.7 °C (98.1 °F) (Temporal)   Resp 20   Wt 104 kg (230 lb)   SpO2 96%            Assessment/Plan     Radha Han is a 60 y.o. female with past medical history of morbid obesity who presented with chest pain.  She has been complaining of chest pain since last night.  The pain is located in the midsternal area.  The pain radiated to her arm.  It is pressure pain in nature.  It is 10/10 intensity.  The pain got better by medication given in the emergency room.  No other aggravating factors.  Associated with nausea.  No diaphoresis.  No shortness of breath.  No orthopnea or PND.  No cough.  No fever or chills.  No abdominal pain.  No diarrhea.  No constipation.  Denies any diarrhea dysuria, hematuria, or frequency.   Assessment & Plan  Chest pain  Secondary to acute coronary syndrome  Morbid obesity    Plan   23-hour observation  Cardiac  telemetry  Serial EKG  Troponin x 3  Echocardiogram  Cardiology consult  Keep her n.p.o. after midnight  Aspirin  Lipitor PO daily  Lipid panel                 Marisol Mccarty MD         [1] No family history on file.

## 2025-08-03 NOTE — ED PROVIDER NOTES
HPI   Chief Complaint   Patient presents with    Chest Pain     Pt began having chest pain last night around 8 pm that kept getting worse and wakes her up throughout the night         History provided by:  Patient and spouse    Chief Complaint   Patient presents with    Chest Pain     Pt began having chest pain last night around 8 pm that kept getting worse and wakes her up throughout the night       History of Present Illness:  Radha Han is a 60 y.o. female presents with chest pain since 8 PM last night.  She describes it as a heaviness in her mid chest.  It radiates down her left arm.  No fever, chills or cough.  No back or flank pain.  No nausea or vomiting.  No shortness of breath.  No leg swelling.  Nothing seems to make her symptoms worse or better.  No trauma or travel.  No sick contacts.      PMFSH:   As per HPI, otherwise nurses notes reviewed in EMR.    Past Medical History: Medical History[1]   Past Surgical History: Surgical History[2]   Family History: Family History[3]   Social History:  Social History[4]  Allergies: Allergies[5]  No current outpatient medications         Patient History   Medical History[6]  Surgical History[7]  Family History[8]  Social History[9]    Physical Exam   ED Triage Vitals [08/03/25 1434]   Temperature Heart Rate Respirations BP   36.7 °C (98.1 °F) 78 20 (!) 196/119      Pulse Ox Temp Source Heart Rate Source Patient Position   99 % Temporal Monitor --      BP Location FiO2 (%)     -- --       Physical Exam  Physical Exam:    ED Triage Vitals [08/03/25 1434]   Temperature Heart Rate Respirations BP   36.7 °C (98.1 °F) 78 20 (!) 196/119      Pulse Ox Temp Source Heart Rate Source Patient Position   99 % Temporal Monitor --      BP Location FiO2 (%)     -- --         Patient Vitals for the past 24 hrs:   BP Temp Temp src Pulse Resp SpO2 Height Weight   08/03/25 1607 (!) 188/96 -- -- -- -- -- -- --   08/03/25 1530 (!) 199/123 -- -- 74 20 96 % -- --   08/03/25 1511  "(!) 178/96 -- -- 68 -- -- -- --   08/03/25 1434 (!) 196/119 36.7 °C (98.1 °F) Temporal 78 20 99 % 1.626 m (5' 4\") 104 kg (230 lb)       Constitutional: Vital signs per nursing notes.  Well developed, well nourished.  No acute distress.    Psychiatric: alert and oriented to person, place, and time; no abnormalities of mood or affect; memory intact  Eyes: PERRL; conjunctivae and lids normal; EOMI  ENT: otoscopic exam of external canal and TM´s normal; nasal mucosa, turbinates, and septum normal; mouth, tongue, and pharynx normal; pharynx without edema, exudate, or injection  Respiratory: normal respiratory effort and excursion; no rales, rhonchi, or wheezes; equal air entry  Cardiovascular: regular rate and rhythm; no murmurs, rubs or gallops; symmetric pulses; no edema; normal capillary refill; distal pulses present  Neurological: normal speech; CN II-XII grossly intact; normal motor and sensory function; no nystagmus  GI: no masses, tenderness, rebound or guarding; no palpable, pulsatile mass; no organomegaly; no hernia; normal bowel sounds; (-) Nascimento´s sign; (-) McBurney´s sign; (-) CVA tenderness  Lymphatic: no adenopathy of neck  Musculoskeletal: normal gait and station; normal digits and nails; no gross tendon or ligament injury; normal to palpation; normal strength/tone; neurovascular status intact; (-) Artie´s sign; (-) straight leg raise; no palpable cords; calf circumference equal bilaterally  Skin: normal to inspection; normal to palpation; no rash  GCS: 15      ED Course & MDM   Diagnoses as of 08/03/25 1611   Chest pain, unspecified type                 No data recorded       HEART Score: 5 (08/03/25 1539 : Caesar ORLANDO MD)                         Medical Decision Making  Medical Decision Making:    EKG: My interpretation of EKG is normal sinus rhythm at 84 bpm with nonspecific ST-T changes              My interpretation of second EKG is normal sinus rhythm at 62 bpm with nonspecific ST-T " changes    Labs:   Labs Reviewed   COMPREHENSIVE METABOLIC PANEL - Abnormal       Result Value    Glucose 113 (*)     Sodium 141      Potassium 3.6      Chloride 107      Bicarbonate 24      Anion Gap 14      Urea Nitrogen 9      Creatinine 0.68      eGFR >90      Calcium 9.2      Albumin 4.5      Alkaline Phosphatase 84      Total Protein 7.1      AST 13      Bilirubin, Total 0.6      ALT 11     MAGNESIUM - Normal    Magnesium 2.07     B-TYPE NATRIURETIC PEPTIDE - Normal    BNP 47      Narrative:        <100 pg/mL - Heart failure unlikely  100-299 pg/mL - Intermediate probability of acute heart                  failure exacerbation. Correlate with clinical                  context and patient history.    >=300 pg/mL - Heart Failure likely. Correlate with clinical                  context and patient history.    BNP testing is performed using different testing methodology at [facility] than at other [facility]. Direct result comparisons should only be made within the same method.      PROTIME-INR - Normal    Protime 11.3      INR 1.0     SERIAL TROPONIN-INITIAL - Normal    Troponin I, High Sensitivity 4      Narrative:     Less than 99th percentile of normal range cutoff-  Female and children under 18 years old <14 ng/L; Male <21 ng/L: Negative  Repeat testing should be performed if clinically indicated.     Female and children under 18 years old 14-50 ng/L; Male 21-50 ng/L:  Consistent with possible cardiac damage and possible increased clinical   risk. Serial measurements may help to assess extent of myocardial damage.     >50 ng/L: Consistent with cardiac damage, increased clinical risk and  myocardial infarction. Serial measurements may help assess extent of   myocardial damage.      NOTE: Children less than 1 year old may have higher baseline troponin   levels and results should be interpreted in conjunction with the overall   clinical context.     NOTE: Troponin I testing is performed using  a different   testing methodology at [facility] than at other   [facility]. Direct result comparisons should only   be made within the same method.   CBC WITH AUTO DIFFERENTIAL    WBC 9.1      nRBC 0.0      RBC 4.83      Hemoglobin 14.3      Hematocrit 41.6      MCV 86      MCH 29.6      MCHC 34.4      RDW 12.8      Platelets 355      Neutrophils % 68.4      Immature Granulocytes %, Automated 0.2      Lymphocytes % 23.3      Monocytes % 5.3      Eosinophils % 2.1      Basophils % 0.7      Neutrophils Absolute 6.23      Immature Granulocytes Absolute, Automated 0.02      Lymphocytes Absolute 2.12      Monocytes Absolute 0.48      Eosinophils Absolute 0.19      Basophils Absolute 0.06     TROPONIN SERIES- (INITIAL, 1 HR)    Narrative:     The following orders were created for panel order Troponin I Series, High Sensitivity (0, 1 HR).  Procedure                               Abnormality         Status                     ---------                               -----------         ------                     Troponin I, High Sensiti...[[id number]]  Normal              Final result               Troponin, High Sensitivi...[[id number]]                      In process                   Please view results for these tests on the individual orders.   SERIAL TROPONIN, 1 HOUR       Diagnostic Imaging:   XR chest 1 view   Final Result   No acute radiographic chest finding.   Signed by Brock Mark MD          ED Medication Administration:   Medications   aspirin tablet 325 mg (325 mg oral Given 8/3/25 1511)   nitroglycerin (Ronaldo-Bid) 2 % ointment 1 inch (1 inch transdermal Given 8/3/25 1511)   ondansetron (Zofran) injection 4 mg (4 mg intravenous Given 8/3/25 1534)   hydrALAZINE (Apresoline) injection 20 mg (20 mg intravenous Given 8/3/25 1607)       ED Course:     Radha Han is a 60 y.o. female presents with chest pain.    Differential Diagnoses Considered:  ACS, arrhythmia, electrolyte disorder,  GERD    Patient presents with chest pain.     Chest x-ray to evaluate for evidence of pneumonia/pneumothorax/CHF.     EKG/troponin to evaluate for evidence of arrhythmia/ACS/AMI.    Lab work to evaluate for evidence of anemia or significant electrolyte abnormality including hypokalemia, hyperkalemia, hyponatremia, hypernatremia, hyperglycemia or hypoglycemia.     Considered CT chest, but no CT chest indicated as I considered but do not suspect aortic dissection/pulmonary embolus.    HEART score 5          Diagnoses as of 08/03/25 1611   Chest pain, unspecified type       Abnormal Labs Reviewed   COMPREHENSIVE METABOLIC PANEL - Abnormal; Notable for the following components:       Result Value    Glucose 113 (*)     All other components within normal limits       BP (!) 188/96 (08/03/25 1607)    Temp      Pulse 74 (08/03/25 1530)   Resp 20 (08/03/25 1530)    SpO2 96 % (08/03/25 1530)          Diagnostic evaluation was completed.  Troponin is in the normal range.  BNP is in the normal range so do not suspect CHF.  Metabolic panel shows a elevated glucose of 113.  Sodium potassium in the normal range.  Renal and liver function are in the normal range.  INR is 1.0.  CBC shows normal white blood cell count and no evidence of anemia.  Platelets are in the normal range.  Chest x-ray shows no acute radiographic chest finding.    Patient was treated with aspirin and Nitropaste.    Medications administered improved the patient's condition.    Repeat evaluation the patient at 3:40 PM shows the patient is feeling better.  Her pain has improved significantly.  Vital signs are stable.    The patient's condition requires ongoing treatment and evaluation necessitating hospital admission.  I have reviewed the patient's history, physical exam, and test information with the admitting physician,     Dr. Carlson              , who agrees to hospitalize the patient.     I discussed the results and plan for hospitalization with the  patient and/or family/friend if present.  Questions were addressed.  Patient and/or family/friend expressed understanding.    Shared decision making made with patient, and/or family, who agrees with plan.        Diagnosis:   1. Chest pain, unspecified type              Procedure  Procedures         [1] No past medical history on file.  [2] No past surgical history on file.  [3] No family history on file.  [4]    [5]   Allergies  Allergen Reactions    Codeine Angioedema   [6] No past medical history on file.  [7] No past surgical history on file.  [8] No family history on file.  [9]   Social History  Tobacco Use    Smoking status: Not on file    Smokeless tobacco: Not on file   Substance Use Topics    Alcohol use: Not on file    Drug use: Not on file        Caesar ORLANDO MD  08/03/25 4143

## 2025-08-03 NOTE — ASSESSMENT & PLAN NOTE
Secondary to acute coronary syndrome  Morbid obesity    Plan   23-hour observation  Cardiac  telemetry  Serial EKG  Troponin x 3  Echocardiogram  Cardiology consult  Keep her n.p.o. after midnight  Aspirin  Lipitor PO daily  Lipid panel

## 2025-08-04 ENCOUNTER — APPOINTMENT (OUTPATIENT)
Dept: CARDIOLOGY | Facility: HOSPITAL | Age: 60
End: 2025-08-04
Payer: COMMERCIAL

## 2025-08-04 VITALS
RESPIRATION RATE: 17 BRPM | WEIGHT: 242.51 LBS | DIASTOLIC BLOOD PRESSURE: 95 MMHG | HEIGHT: 64 IN | BODY MASS INDEX: 41.4 KG/M2 | OXYGEN SATURATION: 94 % | TEMPERATURE: 99 F | HEART RATE: 84 BPM | SYSTOLIC BLOOD PRESSURE: 169 MMHG

## 2025-08-04 LAB
AORTIC VALVE MEAN GRADIENT: 3 MMHG
AORTIC VALVE PEAK VELOCITY: 1.03 M/S
ATRIAL RATE: 86 BPM
AV PEAK GRADIENT: 4 MMHG
AVA (PEAK VEL): 2.35 CM2
AVA (VTI): 2.51 CM2
EJECTION FRACTION APICAL 4 CHAMBER: 66.1
EJECTION FRACTION: 63 %
LEFT ATRIUM VOLUME AREA LENGTH INDEX BSA: 18.2 ML/M2
LEFT VENTRICLE INTERNAL DIMENSION DIASTOLE: 3.93 CM (ref 3.5–6)
LEFT VENTRICULAR OUTFLOW TRACT DIAMETER: 2 CM
LV EJECTION FRACTION BIPLANE: 66 %
MITRAL VALVE E/A RATIO: 0.69
P AXIS: 1 DEGREES
P OFFSET: 178 MS
P ONSET: 121 MS
PR INTERVAL: 180 MS
Q ONSET: 211 MS
QRS COUNT: 14 BEATS
QRS DURATION: 88 MS
QT INTERVAL: 398 MS
QTC CALCULATION(BAZETT): 476 MS
QTC FREDERICIA: 449 MS
R AXIS: 30 DEGREES
RIGHT VENTRICLE FREE WALL PEAK S': 9.05 CM/S
RIGHT VENTRICLE PEAK SYSTOLIC PRESSURE: 23 MMHG
T AXIS: 37 DEGREES
T OFFSET: 410 MS
TRICUSPID ANNULAR PLANE SYSTOLIC EXCURSION: 2.2 CM
VENTRICULAR RATE: 86 BPM

## 2025-08-04 PROCEDURE — 96375 TX/PRO/DX INJ NEW DRUG ADDON: CPT

## 2025-08-04 PROCEDURE — 2500000004 HC RX 250 GENERAL PHARMACY W/ HCPCS (ALT 636 FOR OP/ED): Performed by: HOSPITALIST

## 2025-08-04 PROCEDURE — 2500000001 HC RX 250 WO HCPCS SELF ADMINISTERED DRUGS (ALT 637 FOR MEDICARE OP): Performed by: HOSPITALIST

## 2025-08-04 PROCEDURE — 2500000001 HC RX 250 WO HCPCS SELF ADMINISTERED DRUGS (ALT 637 FOR MEDICARE OP): Performed by: NURSE PRACTITIONER

## 2025-08-04 PROCEDURE — 99239 HOSP IP/OBS DSCHRG MGMT >30: CPT | Performed by: HOSPITALIST

## 2025-08-04 PROCEDURE — 93005 ELECTROCARDIOGRAM TRACING: CPT

## 2025-08-04 PROCEDURE — 93010 ELECTROCARDIOGRAM REPORT: CPT | Performed by: INTERNAL MEDICINE

## 2025-08-04 PROCEDURE — 99223 1ST HOSP IP/OBS HIGH 75: CPT | Performed by: INTERNAL MEDICINE

## 2025-08-04 PROCEDURE — C8929 TTE W OR WO FOL WCON,DOPPLER: HCPCS

## 2025-08-04 PROCEDURE — 93306 TTE W/DOPPLER COMPLETE: CPT | Performed by: INTERNAL MEDICINE

## 2025-08-04 PROCEDURE — G0378 HOSPITAL OBSERVATION PER HR: HCPCS

## 2025-08-04 RX ORDER — LOSARTAN POTASSIUM 25 MG/1
25 TABLET ORAL DAILY
Qty: 30 TABLET | Refills: 2 | Status: SHIPPED | OUTPATIENT
Start: 2025-08-04 | End: 2025-11-02

## 2025-08-04 RX ORDER — NAPROXEN SODIUM 220 MG/1
81 TABLET, FILM COATED ORAL DAILY
Qty: 30 TABLET | Refills: 0 | Status: SHIPPED | OUTPATIENT
Start: 2025-08-05

## 2025-08-04 RX ORDER — LOSARTAN POTASSIUM 25 MG/1
25 TABLET ORAL DAILY
Status: DISCONTINUED | OUTPATIENT
Start: 2025-08-04 | End: 2025-08-04 | Stop reason: HOSPADM

## 2025-08-04 RX ORDER — METOPROLOL TARTRATE 25 MG/1
25 TABLET, FILM COATED ORAL 2 TIMES DAILY
Status: DISCONTINUED | OUTPATIENT
Start: 2025-08-04 | End: 2025-08-04 | Stop reason: HOSPADM

## 2025-08-04 RX ORDER — METOPROLOL TARTRATE 25 MG/1
25 TABLET, FILM COATED ORAL 2 TIMES DAILY
Qty: 60 TABLET | Refills: 2 | Status: SHIPPED | OUTPATIENT
Start: 2025-08-04

## 2025-08-04 RX ADMIN — LOSARTAN POTASSIUM 25 MG: 25 TABLET, FILM COATED ORAL at 11:32

## 2025-08-04 RX ADMIN — CETIRIZINE HYDROCHLORIDE 10 MG: 10 TABLET ORAL at 08:22

## 2025-08-04 RX ADMIN — PANTOPRAZOLE SODIUM 40 MG: 40 INJECTION, POWDER, FOR SOLUTION INTRAVENOUS at 06:49

## 2025-08-04 RX ADMIN — METOPROLOL TARTRATE 25 MG: 25 TABLET, FILM COATED ORAL at 08:22

## 2025-08-04 RX ADMIN — ASPIRIN 81 MG: 81 TABLET, CHEWABLE ORAL at 08:22

## 2025-08-04 RX ADMIN — PERFLUTREN 4 ML OF DILUTION: 6.52 INJECTION, SUSPENSION INTRAVENOUS at 10:40

## 2025-08-04 ASSESSMENT — PAIN SCALES - GENERAL
PAINLEVEL_OUTOF10: 0 - NO PAIN
PAINLEVEL_OUTOF10: 0 - NO PAIN

## 2025-08-04 ASSESSMENT — COGNITIVE AND FUNCTIONAL STATUS - GENERAL
MOBILITY SCORE: 24
DAILY ACTIVITIY SCORE: 24

## 2025-08-04 ASSESSMENT — PAIN - FUNCTIONAL ASSESSMENT
PAIN_FUNCTIONAL_ASSESSMENT: 0-10
PAIN_FUNCTIONAL_ASSESSMENT: 0-10

## 2025-08-04 NOTE — PROGRESS NOTES
Patient had transthoracic echocardiogram performed today that revealed normal LV systolic function with no regional wall motion abnormalities no significant valvular abnormalities.  See report for full summary.  These findings were discussed with she, her daughter, granddaughter, and  at the bedside.  She reports that had back pain after receiving Definity contrast agent therefore will be added to her list of allergies.   Reviewed cardiology plan of care including management of hypertension and outpatient testing and follow-up with Dr. Ash with her.  Reviewed how to collect and record her blood pressure.  Questions were answered.  Patient may be discharged today from cardiology perspective.  They verbalized understanding of information.

## 2025-08-04 NOTE — DISCHARGE INSTRUCTIONS
Dr. Ash has recommended that you obtain an [employer]ron brand blood pressure cuff and check your blood pressure 3 times a day.  Make sure you are sitting upright with your feet flat on the floor and relaxed for 3 to 5 minutes prior to checking your blood pressure.  Record blood pressure and pulse and bring readings to your appointment with Dr. Ash

## 2025-08-04 NOTE — CARE PLAN
"The patient's goals for the shift include \"get better\"    The clinical goals for the shift include no chest pain    Over the shift, the patient did progress toward the following goals.   "
"The patient's goals for the shift include \"get better\"    The clinical goals for the shift include pt will remain free of chest pain      "
5

## 2025-08-04 NOTE — DISCHARGE SUMMARY
Discharge Diagnosis  Chest pain           Issues Requiring Follow-Up  Follow with cardiology  for outpatient stress test    Discharge Meds     Medication List      ASK your doctor about these medications     omeprazole OTC 20 mg EC tablet; Commonly known as: PriLOSEC OTC       Test Results Pending At Discharge  Pending Labs       No current pending labs.            Hospital Course     Radha Han is a 60 y.o. female with past medical history of morbid obesity who presented with chest pain.  She has been complaining of chest pain since last night.  The pain is located in the midsternal area.  The pain radiated to her arm.  It is pressure pain in nature.  It is 10/10 intensity.  The pain got better by medication given in the emergency room.  No other aggravating factors.  Associated with nausea.  No diaphoresis.  No shortness of breath.  No orthopnea or PND.  No cough.  No fever or chills.  No abdominal pain.  No diarrhea.  No constipation.  Denies any diarrhea dysuria, hematuria, or frequency.     She was admitted under observation 23 hours. Troponin were negative. EKG was sinus rhythm, no st changes. Cardiology was consulted. Echocardiogram was unremarkable. Plan for stress test as outpatient. Lopressor bid and Losartan were added for HTN. Will need outpatient Sleep study.         Pertinent Physical Exam At Time of Discharge  Physical Exam  Vitals and nursing note reviewed.   HENT:      Head: Normocephalic and atraumatic.      Nose: Nose normal.      Mouth/Throat:      Mouth: Mucous membranes are dry.      Eyes:      Extraocular Movements: Extraocular movements intact.      Pupils: Pupils are equal, round, and reactive to light.         Cardiovascular:      Rate and Rhythm: Normal rate and regular rhythm.      Pulses: Normal pulses.   Pulmonary:      Effort: Pulmonary effort is normal.      Breath sounds: Normal breath sounds.   Abdominal:      General: Abdomen is flat.      Palpations: Abdomen is soft.       Musculoskeletal:         General: Normal range of motion.      Cervical back: Normal range of motion and neck supple.      Skin:     General: Skin is warm.      Neurological:      General: No focal deficit present.      Mental Status: She is alert and oriented to person, place, and time.      Psychiatric:         Mood and Affect: Mood normal.         Behavior: Behavior normal.        Outpatient Follow-Up  No future appointments.    Discharge time>30 min     Marisol Mccarty MD

## 2025-08-04 NOTE — CONSULTS
Inpatient consult to Cardiology  Consult performed by: OH Farr-CNP  Consult ordered by: Marisol Mccarty MD  Reason for consult: chest pain      [facility] Cardiology Consult Note      Date:   8/4/2025  Patient name:  Radha Han  Date of admission:  8/3/2025  2:21 PM  MRN:   [id number]  YOB: 1965  Time of Consult:  7:56 AM    Consulting Cardiologist: OH Rodriges, CNP  Primary Cardiologist:  none      Admission Diagnosis:     Chest pain      History of Present Illness:      Radha Han is a 60 y.o.  female patient who is being at the request of Dr. Mccarty for inpatient consultation of chest pain. She was admitted on 8/3/2025.  Previous [facility] and [facility] records have been reviewed in detail.    60-year-old female who presented to [facility] emergency department with chest pain that started at 8 PM the night before.  She states that she ate a Ramen burger with her  and went driving around to enjoy the summer evening and when they got home at approximately 8:00 she noticed midsternal chest pain.  Symptoms not radiate.  There was no associated shortness of breath, nausea, vomiting, palpitations.  States that the pain persisted overnight and woke her up from sleep several times.  She did not try to take anything to alleviate the pain.  She has not seen a primary care provider or physician in at least 20 years and takes no prescription medications.  States that she takes over-the-counter medications including omeprazole for acid reflux, melatonin, and magnesium supplements.  She is non-smoker, rare alcohol intake and no drug use.  States had similar episode of chest pain approximately 30 years ago.    Treated with aspirin, nitroglycerin ointment, IV Zofran and received IV hydralazine in emergency room as blood pressure on ED presentation was 196/119.  Laboratory studies with BNP 47, negative troponin x 4 sets.  Hemoglobin A1c 5.4.   Lipid panel cholesterol 160, HDL 50, LDL 96, triglycerides 72.    Comprehensive metabolic panel and CBC within normal limits.  Chest x-ray no acute disease.  EKG personally reviewed revealed sinus rhythm with nonspecific ST-T wave abnormality. She is admitted to medicine service on telemetry and has no recurrence of symptoms since arriving on telemetry unit.  Cardiology consult was placed for evaluation of chest pain    Family history: Father  age 85 history of hypertension.  Mother age 93 enlarged heart, diabetic      Previous Cardiac testing   None    Allergies:     Allergies[1]      Past Medical History:     Medical History[2]    Past Surgical History:     Surgical History[3]    Family History:     Family History[4]    Social History:     Social History[5]    CURRENT INPATIENT MEDICATIONS    Scheduled Medications[6]  Continuous Medications[7]  Current Outpatient Medications   Medication Instructions    omeprazole OTC (PRILOSEC OTC) 20 mg, Daily        Review of Systems:      12 point review of systems was obtained in detail and is negative other than that detailed above.    Vital Signs:     Vitals:    25 1800 25 1830 25 1900 25 0136   BP: 150/65 106/64 153/89 136/75   BP Location:   Left arm Left arm   Patient Position:   Lying Lying   Pulse: (!) 120 (!) 110 110 91   Resp:  18 18 17   Temp:   37.4 °C (99.3 °F) 36.9 °C (98.4 °F)   TempSrc:   Temporal Temporal   SpO2:  95% 95% 93%   Weight:   110 kg (242 lb 8.1 oz)    Height:           Intake/Output Summary (Last 24 hours) at 2025 0756  Last data filed at 8/3/2025 1947  Gross per 24 hour   Intake 250 ml   Output --   Net 250 ml       Wt Readings from Last 4 Encounters:   25 110 kg (242 lb 8.1 oz)       Physical Examination:     GENERAL APPEARANCE: Obese female in no acute distress.  CHEST: Symmetric , tender to palpation lower sternum.  INTEGUMENT: Skin warm and dry, without gross excoriationis or lesions.  HEENT: No gross  abnormalities of conjunctiva, teeth, gums, oral mucosa  NECK: Supple, no JVD, no bruit. Thyroid not palpable. Carotid upstrokes normal.  NEURO/PSHCY: Alert and oriented x3; appropriate behavior and responses. Moves all extremities equally.  LUNGS: Clear to auscultation bilaterally; normal respiratory effort.  HEART: Rate and rhythm regular with no evident murmur; no gallop appreciated. There are no rubs, clicks or heaves. PMI nondisplaced.  ABDOMEN: Obese, soft, nontender.   MUSCULOSKELETAL: Normal range of motion.  EXTREMITIES: Warm with good color, no clubbing or cyanois. There is no edema noted.  PERIPHERAL VASCULAR: 2+ radial and dorsalis pedis pulse bilateral.     Lab:     CBC:   Results from last 7 days   Lab Units 08/03/25  1505   WBC AUTO x10*3/uL 9.1   RBC AUTO x10*6/uL 4.83   HEMOGLOBIN g/dL 14.3   HEMATOCRIT % 41.6   MCV fL 86   MCH pg 29.6   MCHC g/dL 34.4   RDW % 12.8   PLATELETS AUTO x10*3/uL 355     CMP:    Results from last 7 days   Lab Units 08/03/25  1505   SODIUM mmol/L 141   POTASSIUM mmol/L 3.6   CHLORIDE mmol/L 107   CO2 mmol/L 24   BUN mg/dL 9   CREATININE mg/dL 0.68   GLUCOSE mg/dL 113*   PROTEIN TOTAL g/dL 7.1   CALCIUM mg/dL 9.2   BILIRUBIN TOTAL mg/dL 0.6   ALK PHOS U/L 84   AST U/L 13   ALT U/L 11     BMP:    Results from last 7 days   Lab Units 08/03/25  1505   SODIUM mmol/L 141   POTASSIUM mmol/L 3.6   CHLORIDE mmol/L 107   CO2 mmol/L 24   BUN mg/dL 9   CREATININE mg/dL 0.68   CALCIUM mg/dL 9.2   GLUCOSE mg/dL 113*     Magnesium:  Results from last 7 days   Lab Units 08/03/25  1505   MAGNESIUM mg/dL 2.07     Troponin:    Results from last 7 days   Lab Units 08/03/25  1951 08/03/25  1642 08/03/25  1603   TROPHS ng/L 5 4 3     BNP:   Results from last 7 days   Lab Units 08/03/25  1505   BNP pg/mL 47     Lipid Panel:  Results from last 7 days   Lab Units 08/03/25  1642   HDL mg/dL 50.1   CHOLESTEROL/HDL RATIO  3.2   VLDL mg/dL 14   TRIGLYCERIDES mg/dL 72   NON HDL CHOL. mg/dL 110         Diagnostic Studies:   @No results found for this or any previous visit.    ECG 12 lead  Result Date: 8/4/2025  Normal sinus rhythm Inferior infarct , age undetermined Cannot rule out Anterior infarct , age undetermined Abnormal ECG When compared with ECG of 03-AUG-2025 14:29, (unconfirmed) Questionable change in QRS axis Inferior infarct is now Present Nonspecific T wave abnormality now evident in Anterolateral leads Confirmed by Duc Ash ([id number]) on 8/4/2025 7:49:02 AM    XR chest 1 view  Result Date: 8/3/2025  STUDY: Chest Radiograph;  8/3/2025  14:46 INDICATION: Chest pain. COMPARISON: None Available ACCESSION NUMBER(S): [id number] ORDERING CLINICIAN: DMITRY MELGAR TECHNIQUE:  Frontal chest was obtained at 14:46 hours. FINDINGS: Cardiac silhouette normal in size. No significant pulmonary vascular congestion. No significant pleural effusion. No visible pneumothorax.     No acute radiographic chest finding. Signed by Brock Mark MD    ECG 12 lead  Result Date: 8/3/2025  Normal sinus rhythm with sinus arrhythmia Indeterminate axis Nonspecific ST and T wave abnormality Abnormal ECG When compared with ECG of 17-APR-2000 13:57, Nonspecific T wave abnormality now evident in Inferior leads T wave amplitude has decreased in Lateral leads QT has lengthened See ED provider note for full interpretation and clinical correlation      No echocardiogram results found for the past 14 days    Radiology:     XR chest 1 view   Final Result   No acute radiographic chest finding.   Signed by Brock Mark MD      Transthoracic Echo Complete    (Results Pending)       Problem List:     Problem List[8]    Assessment:     Chest pain  Hypertension  Morbid obesity BMI 41.6  Reported history of GERD takes OTC omeprazole as needed    Plan:     Continue aspirin  Add metoprolol 25 mg twice daily  Obtain transthoracic echocardiogram  Discuss with Dr Ash  Outpatient coronary calcium score and exercise nuclear stress  test  Patient instructed to monitor her blood pressure as outpatient    KIEL Johns  [facility]  [facility]      Of note, this documentation is completed using the Dragon Dictation system (voice recognition software). There may be spelling and/or grammatical errors that were not corrected prior to final submission.      Electronically signed by KIEL Farr, on 8/4/2025 at 7:56 AM     Nilsa Note  I have personally reviewed and edited note to reflect my history, physical, review of testing, assessment, plan.  Review of systems are negative, noncontributory, or as previously mentioned x 12 systems.  Exam: Heart-regular, lungs-clear to auscultation  I personally reviewed EKG and chest x-ray:  August 4 EKG: Normal sinus rhythm, possible age-indeterminate inferior wall infarct, poor R wave progression  August her chest x-ray: No acute cardiopulmonary disease.  Admission labs:  Troponin 4, 3, 4, 5  BNP 42  Admission blood pressure 196/119    Assessment:  Chest pain  Hypertension  Gastroesophageal reflux disease  Obesity    Recommendations:  Patient's chest pain was reproducible with palpating anterior chest wall suggesting musculoskeletal etiology.  She did have of fairly poorly controlled blood pressure on admission to the hospital.  Inpatient echocardiogram has been ordered.  If no significant abnormalities can have further outpatient evaluation with exercise nuclear stress test, coronary artery calcium score.  Add metoprolol 25 mg twice daily.  Start aspirin 81 mg daily.  Will hold off statin therapy until able to review complete testing.  I advised patient get a home blood pressure machine and monitor her blood pressure 3 times a day.  Further recommendations based on test findings and clinical course.    Please excuse any errors in grammar or translation related to this dictation.  Voice recognition software was utilized to prepare  this document.         [1]   Allergies  Allergen Reactions    Codeine Angioedema   [2] History reviewed. No pertinent past medical history.  [3] History reviewed. No pertinent surgical history.  [4] No family history on file.  [5]   Social History  Tobacco Use    Smoking status: Never    Smokeless tobacco: Never   Substance Use Topics    Alcohol use: Never   [6] aspirin, 81 mg, oral, Daily  atorvastatin, 40 mg, oral, Nightly  cetirizine, 10 mg, oral, Daily  enoxaparin, 40 mg, subcutaneous, q24h  pantoprazole, 40 mg, oral, Daily before breakfast   Or  pantoprazole, 40 mg, intravenous, Daily before breakfast  polyethylene glycol, 17 g, oral, Daily  [7]    [8]   Patient Active Problem List  Diagnosis    Chest pain

## 2025-08-05 ENCOUNTER — PATIENT OUTREACH (OUTPATIENT)
Dept: CARDIOLOGY | Facility: CLINIC | Age: 60
End: 2025-08-05
Payer: COMMERCIAL

## 2025-08-05 NOTE — PROGRESS NOTES
Discharge Facility:  [facility]    Discharge Diagnosis:  Chest pain    Admission Date:  8/3/25  Discharge Date:   8/4/25    PCP Appointment Date:  TBD  Specialist Appointment Date:   CT Cardiac Scoring wo IV Contrast Wednesday Aug 6, 2025 3:00 PM   Nuclear Medicine stress test  Aug 12, 2025 and Aug, 13 2025  Appointment with Duc Ash DO (08/21/2025) -messaged office   Hospital Encounter and Summary Linked: Yes  Discharge Summary by Marisol Mccarty MD (08/04/2025 09:29)   See discharge assessment below for further details  Wrap Up  Wrap Up Additional Comments: Successful transition of care outreach within 2 business days of discharge. CM introduced myself and the TCM program.   CM gave my contact information and encouraged to call if needing assistance or has any further non-emergent questions prior to my next outreach.   Reviewed hospital stay and answered any questions. Radha got her home blood pressure machine today and monitor her blood pressure 3 times a day. We reviewed proper technique and to log BP and pulse and take into her follow up appt. Patient thanked me for my outreach and denies any further discharge questions/needs at this time. (8/5/2025  9:40 AM)    Medications  Medications reviewed with patient/caregiver?: Yes (8/5/2025  9:40 AM)  Is the patient having any side effects they believe may be caused by any medication additions or changes?: No (8/5/2025  9:40 AM)  Does the patient have all medications ordered at discharge?: Yes (8/5/2025  9:40 AM)  Prescription Comments: START taking: aspirin Start taking on: August 5, 2025 losartan (Cozaar) metoprolol tartrate (Lopressor (8/5/2025  9:40 AM)  Is the patient taking all medications as directed (includes completed medication regime)?: Yes (8/5/2025  9:40 AM)  Medication Comments: CM discussed referencing discharge paperwork to follow detailed daily medication schedule.  Endorses understanding & compliance with  medications. Encouraged to reach out to office or CM with any medication questions in future. Reminded to bring all medications/med list to future appointments. (8/5/2025  9:40 AM)    Appointments  Has the patient kept scheduled appointments due by today?: Yes (8/5/2025  9:40 AM)  Care Management Interventions: Educated on importance of keeping appointment (reviewed up coming appts and testing) (8/5/2025  9:40 AM)    Self Management  What is the home health agency?: na (8/5/2025  9:40 AM)  What Durable Medical Equipment (DME) was ordered?: na (8/5/2025  9:40 AM)    Patient Teaching  Does the patient have access to their discharge instructions?: Yes (8/5/2025  9:40 AM)  Care Management Interventions: Reviewed instructions with patient; Educated on MyChart (8/5/2025  9:40 AM)  What is the patient's perception of their health status since discharge?: Improving (8/5/2025  9:40 AM)  Is the patient/caregiver able to teach back the hierarchy of who to call/visit for symptoms/problems? PCP, Specialist, Home Health nurse, Urgent Care, ED, 911: Yes (8/5/2025  9:40 AM)

## 2025-08-06 ENCOUNTER — HOSPITAL ENCOUNTER (OUTPATIENT)
Dept: RADIOLOGY | Facility: HOSPITAL | Age: 60
Discharge: HOME | End: 2025-08-06
Payer: COMMERCIAL

## 2025-08-06 DIAGNOSIS — R07.9 CHEST PAIN, UNSPECIFIED TYPE: ICD-10-CM

## 2025-08-06 PROCEDURE — 75571 CT HRT W/O DYE W/CA TEST: CPT

## 2025-08-08 LAB
ATRIAL RATE: 84 BPM
P AXIS: 44 DEGREES
P OFFSET: 183 MS
P ONSET: 126 MS
PR INTERVAL: 180 MS
Q ONSET: 216 MS
QRS COUNT: 14 BEATS
QRS DURATION: 80 MS
QT INTERVAL: 396 MS
QTC CALCULATION(BAZETT): 467 MS
QTC FREDERICIA: 442 MS
R AXIS: 162 DEGREES
T AXIS: 68 DEGREES
T OFFSET: 414 MS
VENTRICULAR RATE: 84 BPM

## 2025-08-10 LAB
ATRIAL RATE: 62 BPM
P AXIS: 26 DEGREES
P OFFSET: 189 MS
P ONSET: 135 MS
PR INTERVAL: 186 MS
Q ONSET: 228 MS
QRS COUNT: 10 BEATS
QRS DURATION: 84 MS
QT INTERVAL: 436 MS
QTC CALCULATION(BAZETT): 442 MS
QTC FREDERICIA: 441 MS
R AXIS: -9 DEGREES
T AXIS: 53 DEGREES
T OFFSET: 446 MS
VENTRICULAR RATE: 62 BPM

## 2025-08-12 ENCOUNTER — APPOINTMENT (OUTPATIENT)
Dept: RADIOLOGY | Facility: HOSPITAL | Age: 60
End: 2025-08-12
Payer: COMMERCIAL

## 2025-08-12 ENCOUNTER — APPOINTMENT (OUTPATIENT)
Dept: CARDIOLOGY | Facility: HOSPITAL | Age: 60
End: 2025-08-12
Payer: COMMERCIAL

## 2025-08-13 ENCOUNTER — APPOINTMENT (OUTPATIENT)
Dept: RADIOLOGY | Facility: HOSPITAL | Age: 60
End: 2025-08-13
Payer: COMMERCIAL

## 2025-08-21 ENCOUNTER — APPOINTMENT (OUTPATIENT)
Dept: CARDIOLOGY | Facility: CLINIC | Age: 60
End: 2025-08-21
Payer: COMMERCIAL

## 2025-08-21 VITALS
SYSTOLIC BLOOD PRESSURE: 122 MMHG | DIASTOLIC BLOOD PRESSURE: 72 MMHG | BODY MASS INDEX: 39.94 KG/M2 | WEIGHT: 239.7 LBS | HEIGHT: 65 IN | HEART RATE: 68 BPM

## 2025-08-21 DIAGNOSIS — I10 ESSENTIAL (PRIMARY) HYPERTENSION: ICD-10-CM

## 2025-08-21 DIAGNOSIS — I10 HYPERTENSION, UNSPECIFIED TYPE: ICD-10-CM

## 2025-08-21 DIAGNOSIS — I51.7 LVH (LEFT VENTRICULAR HYPERTROPHY): ICD-10-CM

## 2025-08-21 DIAGNOSIS — R07.89 OTHER CHEST PAIN: ICD-10-CM

## 2025-08-21 DIAGNOSIS — Z78.9 NEVER SMOKED TOBACCO: ICD-10-CM

## 2025-08-21 PROCEDURE — 3074F SYST BP LT 130 MM HG: CPT | Performed by: INTERNAL MEDICINE

## 2025-08-21 PROCEDURE — 3008F BODY MASS INDEX DOCD: CPT | Performed by: INTERNAL MEDICINE

## 2025-08-21 PROCEDURE — 99214 OFFICE O/P EST MOD 30 MIN: CPT | Performed by: INTERNAL MEDICINE

## 2025-08-21 PROCEDURE — 3078F DIAST BP <80 MM HG: CPT | Performed by: INTERNAL MEDICINE

## 2025-08-21 PROCEDURE — 1036F TOBACCO NON-USER: CPT | Performed by: INTERNAL MEDICINE

## 2025-08-21 RX ORDER — LOSARTAN POTASSIUM 25 MG/1
25 TABLET ORAL DAILY
Qty: 90 TABLET | Refills: 3 | Status: SHIPPED | OUTPATIENT
Start: 2025-08-21 | End: 2026-08-21

## 2025-08-21 RX ORDER — METOPROLOL TARTRATE 25 MG/1
25 TABLET, FILM COATED ORAL 2 TIMES DAILY
Qty: 180 TABLET | Refills: 3 | Status: SHIPPED | OUTPATIENT
Start: 2025-08-21 | End: 2026-08-21

## 2025-08-21 RX ORDER — MAGNESIUM 250 MG
1 TABLET ORAL DAILY
COMMUNITY

## 2025-08-26 ENCOUNTER — HOSPITAL ENCOUNTER (OUTPATIENT)
Dept: CARDIOLOGY | Facility: CLINIC | Age: 60
Discharge: HOME | End: 2025-08-26
Payer: COMMERCIAL

## 2025-08-26 DIAGNOSIS — I10 ESSENTIAL (PRIMARY) HYPERTENSION: ICD-10-CM

## 2025-08-26 DIAGNOSIS — R07.89 OTHER CHEST PAIN: ICD-10-CM

## 2025-08-26 PROCEDURE — 93017 CV STRESS TEST TRACING ONLY: CPT

## 2025-08-26 PROCEDURE — 93018 CV STRESS TEST I&R ONLY: CPT | Performed by: INTERNAL MEDICINE

## 2025-08-26 PROCEDURE — 93016 CV STRESS TEST SUPVJ ONLY: CPT | Performed by: INTERNAL MEDICINE

## 2025-08-28 ENCOUNTER — PATIENT OUTREACH (OUTPATIENT)
Dept: CARDIOLOGY | Facility: CLINIC | Age: 60
End: 2025-08-28
Payer: COMMERCIAL

## 2026-02-19 ENCOUNTER — APPOINTMENT (OUTPATIENT)
Dept: CARDIOLOGY | Facility: CLINIC | Age: 61
End: 2026-02-19
Payer: COMMERCIAL